# Patient Record
Sex: MALE | Race: WHITE | NOT HISPANIC OR LATINO | Employment: FULL TIME | URBAN - METROPOLITAN AREA
[De-identification: names, ages, dates, MRNs, and addresses within clinical notes are randomized per-mention and may not be internally consistent; named-entity substitution may affect disease eponyms.]

---

## 2023-10-05 ENCOUNTER — TELEPHONE (OUTPATIENT)
Dept: PSYCHIATRY | Facility: CLINIC | Age: 28
End: 2023-10-05

## 2023-10-05 NOTE — TELEPHONE ENCOUNTER
Contacted patient in regards to [employer] in attempts to place patient on proper wait list for services. LVM for patient to contact intake dept.

## 2024-08-16 ENCOUNTER — TELEPHONE (OUTPATIENT)
Age: 29
End: 2024-08-16

## 2024-08-16 NOTE — TELEPHONE ENCOUNTER
Contacted patient off of [facility]  and NON-REFERRAL to verify needs of services in attempts to offer patient an appointment. LVM for patient to contact intake dept  in regards to wait list.

## 2024-09-23 ENCOUNTER — TELEPHONE (OUTPATIENT)
Age: 29
End: 2024-09-23